# Patient Record
Sex: FEMALE | Race: OTHER | HISPANIC OR LATINO | URBAN - METROPOLITAN AREA
[De-identification: names, ages, dates, MRNs, and addresses within clinical notes are randomized per-mention and may not be internally consistent; named-entity substitution may affect disease eponyms.]

---

## 2017-12-08 ENCOUNTER — EMERGENCY (EMERGENCY)
Facility: HOSPITAL | Age: 82
LOS: 1 days | Discharge: HOME | End: 2017-12-08
Attending: EMERGENCY MEDICINE | Admitting: EMERGENCY MEDICINE
Payer: MEDICARE

## 2017-12-08 VITALS
OXYGEN SATURATION: 99 % | HEART RATE: 66 BPM | RESPIRATION RATE: 18 BRPM | TEMPERATURE: 98 F | DIASTOLIC BLOOD PRESSURE: 79 MMHG | SYSTOLIC BLOOD PRESSURE: 180 MMHG

## 2017-12-08 PROCEDURE — 99284 EMERGENCY DEPT VISIT MOD MDM: CPT

## 2017-12-08 RX ADMIN — Medication 100 MILLIGRAM(S): at 17:45

## 2017-12-08 NOTE — ED PROVIDER NOTE - CARE PLAN
Principal Discharge DX:	Cellulitis Principal Discharge DX:	Cellulitis  Instructions for follow-up, activity and diet:	1) Please finish the course of antibiotics, clindamycin, that you were prescribed. If you start seeing pus from the wounds, increase in redness, swelling, severe pain from the wounds, fevers, please return to the ED immediately.  2) Please follow-up with your primary care doctor within the next 5 days.  Please call today or tomorrow for an appointment.  If you cannot follow-up with your doctor(s), please return to the ED for any urgent issues.  2) If you have any worsening of symptoms or any other concerns please return to the ED immediately.  3) Please continue taking your home medications as directed.  4) You may have been given a copy of your labs and/or imaging.  Please go over these with your primary care doctor.

## 2017-12-08 NOTE — ED PROVIDER NOTE - NS ED ROS FT
Constitutional: no fevers, chills  HEENT: no visual changes, no sore throat, no rhinorrhea  CV: no cp  Resp: no sob  GI: no abd pain, n/v, diarrhea/constipation  : no dysuria, hematuria  MSK: no joint pains  skin: +redness around bx LUE/LLE  neuro: no HA, no confusion  psych: no SI/HI

## 2017-12-08 NOTE — ED PROVIDER NOTE - ATTENDING CONTRIBUTION TO CARE
I performed a face-to-face evaluation of the patient and performed a history and physical examination. I agree with the history and physical examination.    Older woman, skin Bx 1-2 weeks ago (no results yet), p/w areas of redness in the 2 Bx areas not improving w/ 2 days Doxycycline. No F. The 2 Bx sites have erythema, not hot/tender. No pus. Will change Doxy to Clinda. Momo w/ indelible marker. Return if worsening.

## 2017-12-08 NOTE — ED PROVIDER NOTE - OBJECTIVE STATEMENT
83yo F hx of HTN, p/w r/o infection s/p biopsy of LUE, LLE. Pt had shave biopsy done on Nov 30th on two lesions - one on LUE, one on LLE. Reported redness around the biopsies sites started 2d after the biopsy. Was seen at urgent care 2d ago, told she had an infection and rx doxy. Has been on doxy for two days and using neosporin. Reports the redness has continued to spread. + chills. Denies fevers, pain at the biopsy sites. Denies pus drainage from bx sites. No hx of DM.

## 2017-12-08 NOTE — ED PROVIDER NOTE - MEDICAL DECISION MAKING DETAILS
81yo F hx of HTN, p/w r/o infection s/p biopsy of AGUSTO CAMERON. 83yo F hx of HTN, p/w r/o infection s/p biopsy of AGUSTO CAMERON. On PE, biopsy sites look like healing wounds with granulation tissue. No signs of infection at this moment. Anticipate d/c home. 81yo F hx of HTN, p/w r/o infection s/p biopsy of AGUSTO CAMERON. Cellulitis v normal granulation healing process. On PE, biopsy sites look like healing wounds with granulation tissue. No signs of infection at this moment. However, was unable to see status of the wounds prior to abx use - unable to completely r/o infection. Will give a dose of clinda, d/c home with remaining course of clinda.

## 2017-12-08 NOTE — ED ADULT TRIAGE NOTE - CHIEF COMPLAINT QUOTE
s/p skin biopsy to left leg and left arm on 11/30/17.  Site to left arm noted to be red and swollen with yellow granulation.  Pt given PO ABX without change.

## 2017-12-08 NOTE — ED PROVIDER NOTE - PLAN OF CARE
1) Please finish the course of antibiotics, clindamycin, that you were prescribed. If you start seeing pus from the wounds, increase in redness, swelling, severe pain from the wounds, fevers, please return to the ED immediately.  2) Please follow-up with your primary care doctor within the next 5 days.  Please call today or tomorrow for an appointment.  If you cannot follow-up with your doctor(s), please return to the ED for any urgent issues.  2) If you have any worsening of symptoms or any other concerns please return to the ED immediately.  3) Please continue taking your home medications as directed.  4) You may have been given a copy of your labs and/or imaging.  Please go over these with your primary care doctor.

## 2017-12-08 NOTE — ED PROVIDER NOTE - PHYSICAL EXAMINATION
Vitals: WNL  Gen: sitting comfortably in NAD  HEENT: NCAT, sclerae white, anicterus, moist mucous membranes. No exudates  CV: RRR. Audible S1 and S2. No murmurs, rubs, gallops, S3, nor S4  Pulm: Clear to auscultation bilaterally. No wheezes, rales, or rhonchi  Abd: soft, normoactive BS x4, NTND, no rebound, no guarding, no rashes  Musculoskeletal:  No peripheral edema  Skin: 2.5cm round lesion with white granulation tissue on LUE/LLE with ring of erythema around lesions, no tenderness or swelling around lesions, no warmth, no pus, erythema around LUE outlined in distribution of a square in a similar distribution to the bandaid initially on the lesions  Neurologic: responds to questions appropriately

## 2018-11-18 ENCOUNTER — EMERGENCY (EMERGENCY)
Facility: HOSPITAL | Age: 83
LOS: 1 days | Discharge: HOME | End: 2018-11-18
Attending: EMERGENCY MEDICINE
Payer: COMMERCIAL

## 2018-11-18 VITALS
OXYGEN SATURATION: 94 % | TEMPERATURE: 98 F | HEART RATE: 63 BPM | SYSTOLIC BLOOD PRESSURE: 130 MMHG | RESPIRATION RATE: 17 BRPM | DIASTOLIC BLOOD PRESSURE: 70 MMHG

## 2018-11-18 VITALS
RESPIRATION RATE: 20 BRPM | HEART RATE: 65 BPM | OXYGEN SATURATION: 95 % | TEMPERATURE: 98 F | SYSTOLIC BLOOD PRESSURE: 124 MMHG | HEIGHT: 64 IN | DIASTOLIC BLOOD PRESSURE: 63 MMHG | WEIGHT: 128.09 LBS

## 2018-11-18 LAB
ALBUMIN SERPL ELPH-MCNC: 4.3 G/DL — SIGNIFICANT CHANGE UP (ref 3.3–5)
ALP SERPL-CCNC: 76 U/L — SIGNIFICANT CHANGE UP (ref 40–120)
ALT FLD-CCNC: 22 U/L — SIGNIFICANT CHANGE UP (ref 10–45)
ANION GAP SERPL CALC-SCNC: 15 MMOL/L — SIGNIFICANT CHANGE UP (ref 5–17)
AST SERPL-CCNC: 33 U/L — SIGNIFICANT CHANGE UP (ref 10–40)
BASOPHILS # BLD AUTO: 0 K/UL — SIGNIFICANT CHANGE UP (ref 0–0.2)
BASOPHILS NFR BLD AUTO: 0.3 % — SIGNIFICANT CHANGE UP (ref 0–2)
BILIRUB SERPL-MCNC: 0.2 MG/DL — SIGNIFICANT CHANGE UP (ref 0.2–1.2)
BUN SERPL-MCNC: 19 MG/DL — SIGNIFICANT CHANGE UP (ref 7–23)
CALCIUM SERPL-MCNC: 9.1 MG/DL — SIGNIFICANT CHANGE UP (ref 8.4–10.5)
CHLORIDE SERPL-SCNC: 93 MMOL/L — LOW (ref 96–108)
CO2 SERPL-SCNC: 26 MMOL/L — SIGNIFICANT CHANGE UP (ref 22–31)
CREAT SERPL-MCNC: 0.97 MG/DL — SIGNIFICANT CHANGE UP (ref 0.5–1.3)
EOSINOPHIL # BLD AUTO: 0 K/UL — SIGNIFICANT CHANGE UP (ref 0–0.5)
EOSINOPHIL NFR BLD AUTO: 0.6 % — SIGNIFICANT CHANGE UP (ref 0–6)
FLUAV H1 2009 PAND RNA SPEC QL NAA+PROBE: DETECTED
GAS PNL BLDV: SIGNIFICANT CHANGE UP
GLUCOSE SERPL-MCNC: 97 MG/DL — SIGNIFICANT CHANGE UP (ref 70–99)
HCT VFR BLD CALC: 38.9 % — SIGNIFICANT CHANGE UP (ref 34.5–45)
HGB BLD-MCNC: 13.3 G/DL — SIGNIFICANT CHANGE UP (ref 11.5–15.5)
LYMPHOCYTES # BLD AUTO: 1.1 K/UL — SIGNIFICANT CHANGE UP (ref 1–3.3)
LYMPHOCYTES # BLD AUTO: 26.8 % — SIGNIFICANT CHANGE UP (ref 13–44)
MCHC RBC-ENTMCNC: 30 PG — SIGNIFICANT CHANGE UP (ref 27–34)
MCHC RBC-ENTMCNC: 34.3 GM/DL — SIGNIFICANT CHANGE UP (ref 32–36)
MCV RBC AUTO: 87.6 FL — SIGNIFICANT CHANGE UP (ref 80–100)
MONOCYTES # BLD AUTO: 0.6 K/UL — SIGNIFICANT CHANGE UP (ref 0–0.9)
MONOCYTES NFR BLD AUTO: 15.2 % — HIGH (ref 2–14)
NEUTROPHILS # BLD AUTO: 2.2 K/UL — SIGNIFICANT CHANGE UP (ref 1.8–7.4)
NEUTROPHILS NFR BLD AUTO: 57.2 % — SIGNIFICANT CHANGE UP (ref 43–77)
PLATELET # BLD AUTO: 201 K/UL — SIGNIFICANT CHANGE UP (ref 150–400)
POTASSIUM SERPL-MCNC: 3.4 MMOL/L — LOW (ref 3.5–5.3)
POTASSIUM SERPL-SCNC: 3.4 MMOL/L — LOW (ref 3.5–5.3)
PROT SERPL-MCNC: 7.2 G/DL — SIGNIFICANT CHANGE UP (ref 6–8.3)
RAPID RVP RESULT: DETECTED
RBC # BLD: 4.44 M/UL — SIGNIFICANT CHANGE UP (ref 3.8–5.2)
RBC # FLD: 11.8 % — SIGNIFICANT CHANGE UP (ref 10.3–14.5)
SODIUM SERPL-SCNC: 134 MMOL/L — LOW (ref 135–145)
WBC # BLD: 3.9 K/UL — SIGNIFICANT CHANGE UP (ref 3.8–10.5)
WBC # FLD AUTO: 3.9 K/UL — SIGNIFICANT CHANGE UP (ref 3.8–10.5)

## 2018-11-18 PROCEDURE — 87581 M.PNEUMON DNA AMP PROBE: CPT

## 2018-11-18 PROCEDURE — 80053 COMPREHEN METABOLIC PANEL: CPT

## 2018-11-18 PROCEDURE — 87633 RESP VIRUS 12-25 TARGETS: CPT

## 2018-11-18 PROCEDURE — 83605 ASSAY OF LACTIC ACID: CPT

## 2018-11-18 PROCEDURE — 99284 EMERGENCY DEPT VISIT MOD MDM: CPT | Mod: 25

## 2018-11-18 PROCEDURE — 84295 ASSAY OF SERUM SODIUM: CPT

## 2018-11-18 PROCEDURE — 85027 COMPLETE CBC AUTOMATED: CPT

## 2018-11-18 PROCEDURE — 94640 AIRWAY INHALATION TREATMENT: CPT

## 2018-11-18 PROCEDURE — 82947 ASSAY GLUCOSE BLOOD QUANT: CPT

## 2018-11-18 PROCEDURE — 82330 ASSAY OF CALCIUM: CPT

## 2018-11-18 PROCEDURE — 99284 EMERGENCY DEPT VISIT MOD MDM: CPT

## 2018-11-18 PROCEDURE — 84132 ASSAY OF SERUM POTASSIUM: CPT

## 2018-11-18 PROCEDURE — 71250 CT THORAX DX C-: CPT

## 2018-11-18 PROCEDURE — 82803 BLOOD GASES ANY COMBINATION: CPT

## 2018-11-18 PROCEDURE — 71046 X-RAY EXAM CHEST 2 VIEWS: CPT | Mod: 26

## 2018-11-18 PROCEDURE — 85014 HEMATOCRIT: CPT

## 2018-11-18 PROCEDURE — 87798 DETECT AGENT NOS DNA AMP: CPT

## 2018-11-18 PROCEDURE — 82435 ASSAY OF BLOOD CHLORIDE: CPT

## 2018-11-18 PROCEDURE — 71046 X-RAY EXAM CHEST 2 VIEWS: CPT

## 2018-11-18 PROCEDURE — 87486 CHLMYD PNEUM DNA AMP PROBE: CPT

## 2018-11-18 PROCEDURE — 71250 CT THORAX DX C-: CPT | Mod: 26

## 2018-11-18 RX ORDER — IPRATROPIUM/ALBUTEROL SULFATE 18-103MCG
3 AEROSOL WITH ADAPTER (GRAM) INHALATION ONCE
Qty: 0 | Refills: 0 | Status: COMPLETED | OUTPATIENT
Start: 2018-11-18 | End: 2018-11-18

## 2018-11-18 RX ORDER — POTASSIUM CHLORIDE 20 MEQ
40 PACKET (EA) ORAL ONCE
Qty: 0 | Refills: 0 | Status: COMPLETED | OUTPATIENT
Start: 2018-11-18 | End: 2018-11-18

## 2018-11-18 RX ADMIN — Medication 40 MILLIEQUIVALENT(S): at 15:40

## 2018-11-18 RX ADMIN — Medication 3 MILLILITER(S): at 15:40

## 2018-11-18 NOTE — ED ADULT NURSE REASSESSMENT NOTE - NS ED NURSE REASSESS COMMENT FT1
Pt resting comfortably in stretcher breathing unlabored on room air. Assisted pt to bathroom to change into gown. Pt ambulates with no assistive devices, steady gait. Awaiting chest X-ray. Daughter at bedside.

## 2018-11-18 NOTE — ED PROVIDER NOTE - OBJECTIVE STATEMENT
82yo F w/ pmhx of HTN, c/o 1 weeks of productive cough, runny nose, sore throat, associated with SOB, chills, but no fever. Denies any nausea, vomiting, sick contacts, recent travel or any other symptoms.

## 2018-11-18 NOTE — ED ADULT NURSE NOTE - OBJECTIVE STATEMENT
Pt is a 83 year old A&O x3 female with hx of osteoporosis, HTN, and diverticulosis presenting to the ED with a cough for 5 days. Pt states he went to urgent care on Thursday and was given "Allergy nasal spray and medication" with no improvement in symptoms. Pt endorses 5/10 pain to the throat and ribs "when I cough" and has had sputum "white" in color. Pt states she had diarrhea this morning after taking "a stool softener", denies blood in the stool. Pt denies recent fevers at home and states she took Tylenol for the pain at home. Pt denies chest pain, shortness of breath, nausea/vomiting, numbness/tingling to the extremities. Pt is breathing unlabored on room air in no apparent distress. Lung sounds clear. No use of accessory muscles. Abdomen is soft and non-tender. Pt is able to move upper and lower extremities. Ambulatory without any assistive devices. Skin is warm and dry, positive peripheral pulses. Daughter at bedside. Safety and comfort maintained, bed in lowest position.

## 2018-11-18 NOTE — ED PROVIDER NOTE - PROGRESS NOTE DETAILS
Lab result was remarkable for Influenza. Pt was reassessed, feeling better, stable and ready to be discharged.

## 2018-11-18 NOTE — ED ADULT NURSE REASSESSMENT NOTE - NS ED NURSE REASSESS COMMENT FT1
Pt resting comfortably in bed breathing unlabored on room air. MD Saldivar made aware that results are available. Awaiting dispo.

## 2018-11-18 NOTE — ED PROVIDER NOTE - ATTENDING CONTRIBUTION TO CARE
Attending MD Ramirez:  I personally have seen and examined this patient.  Resident note reviewed and agree on plan of care and except where noted.  See MDM for details.

## 2018-11-18 NOTE — ED ADULT NURSE REASSESSMENT NOTE - NS ED NURSE REASSESS COMMENT FT1
Pt resting comfortably in stretcher, breathing unlabored on room air. Educated pt and daughter on plan of care and of importance of proper hand hygiene. Droplet precautions in place. Safety and comfort maintained. Call bell within reach.

## 2018-11-18 NOTE — ED ADULT NURSE NOTE - NSIMPLEMENTINTERV_GEN_ALL_ED
Implemented All Universal Safety Interventions:  [place] to call system. Call bell, personal items and telephone within reach. Instruct patient to call for assistance. Room bathroom lighting operational. Non-slip footwear when patient is off stretcher. Physically safe environment: no spills, clutter or unnecessary equipment. Stretcher in lowest position, wheels locked, appropriate side rails in place.

## 2018-11-18 NOTE — ED ADULT NURSE NOTE - CHPI ED NUR SYMPTOMS NEG
no diaphoresis/no edema/no chills/no body aches/no wheezing/no headache/no hemoptysis/no shortness of breath/no fever/no chest pain

## 2022-01-07 NOTE — ED ADULT TRIAGE NOTE - ACCOMPANIED BY
Refill Request   PATIENT IS REQUESTING A 90 DAY SUPPLY     Methylphenidate 20 mg     Pharmacy: [facility]     LA: 9/17/21
Immediate family member

## 2024-02-02 ENCOUNTER — EMERGENCY (EMERGENCY)
Facility: HOSPITAL | Age: 89
LOS: 1 days | Discharge: HOME | End: 2024-02-02
Attending: EMERGENCY MEDICINE
Payer: COMMERCIAL

## 2024-02-02 VITALS
OXYGEN SATURATION: 99 % | RESPIRATION RATE: 18 BRPM | DIASTOLIC BLOOD PRESSURE: 82 MMHG | WEIGHT: 119.93 LBS | HEART RATE: 83 BPM | SYSTOLIC BLOOD PRESSURE: 145 MMHG | HEIGHT: 64 IN | TEMPERATURE: 99 F

## 2024-02-02 PROBLEM — M81.0 AGE-RELATED OSTEOPOROSIS WITHOUT CURRENT PATHOLOGICAL FRACTURE: Chronic | Status: ACTIVE | Noted: 2018-11-18

## 2024-02-02 PROBLEM — K57.92 DIVERTICULITIS OF INTESTINE, PART UNSPECIFIED, WITHOUT PERFORATION OR ABSCESS WITHOUT BLEEDING: Chronic | Status: ACTIVE | Noted: 2018-11-18

## 2024-02-02 PROBLEM — M19.90 UNSPECIFIED OSTEOARTHRITIS, UNSPECIFIED SITE: Chronic | Status: ACTIVE | Noted: 2018-11-18

## 2024-02-02 PROBLEM — I10 ESSENTIAL (PRIMARY) HYPERTENSION: Chronic | Status: ACTIVE | Noted: 2018-11-18

## 2024-02-02 LAB
ALBUMIN SERPL ELPH-MCNC: 4 G/DL — SIGNIFICANT CHANGE UP (ref 3.3–5)
ALP SERPL-CCNC: 131 U/L — HIGH (ref 40–120)
ALT FLD-CCNC: 16 U/L — SIGNIFICANT CHANGE UP (ref 10–45)
ANION GAP SERPL CALC-SCNC: 12 MMOL/L — SIGNIFICANT CHANGE UP (ref 5–17)
AST SERPL-CCNC: 20 U/L — SIGNIFICANT CHANGE UP (ref 10–40)
BASE EXCESS BLDV CALC-SCNC: 4.6 MMOL/L — HIGH (ref -2–3)
BASOPHILS # BLD AUTO: 0.02 K/UL — SIGNIFICANT CHANGE UP (ref 0–0.2)
BASOPHILS NFR BLD AUTO: 0.2 % — SIGNIFICANT CHANGE UP (ref 0–2)
BILIRUB SERPL-MCNC: 0.4 MG/DL — SIGNIFICANT CHANGE UP (ref 0.2–1.2)
BUN SERPL-MCNC: 13 MG/DL — SIGNIFICANT CHANGE UP (ref 7–23)
CA-I SERPL-SCNC: 1.21 MMOL/L — SIGNIFICANT CHANGE UP (ref 1.15–1.33)
CALCIUM SERPL-MCNC: 9.1 MG/DL — SIGNIFICANT CHANGE UP (ref 8.4–10.5)
CHLORIDE BLDV-SCNC: 98 MMOL/L — SIGNIFICANT CHANGE UP (ref 96–108)
CHLORIDE SERPL-SCNC: 96 MMOL/L — SIGNIFICANT CHANGE UP (ref 96–108)
CO2 BLDV-SCNC: 33 MMOL/L — HIGH (ref 22–26)
CO2 SERPL-SCNC: 25 MMOL/L — SIGNIFICANT CHANGE UP (ref 22–31)
CREAT SERPL-MCNC: 0.77 MG/DL — SIGNIFICANT CHANGE UP (ref 0.5–1.3)
D DIMER BLD IA.RAPID-MCNC: 382 NG/ML DDU — HIGH
EGFR: 75 ML/MIN/1.73M2 — SIGNIFICANT CHANGE UP
EOSINOPHIL # BLD AUTO: 0.03 K/UL — SIGNIFICANT CHANGE UP (ref 0–0.5)
EOSINOPHIL NFR BLD AUTO: 0.4 % — SIGNIFICANT CHANGE UP (ref 0–6)
FLUAV AG NPH QL: SIGNIFICANT CHANGE UP
FLUBV AG NPH QL: SIGNIFICANT CHANGE UP
GAS PNL BLDV: 132 MMOL/L — LOW (ref 136–145)
GAS PNL BLDV: SIGNIFICANT CHANGE UP
GAS PNL BLDV: SIGNIFICANT CHANGE UP
GLUCOSE BLDV-MCNC: 115 MG/DL — HIGH (ref 70–99)
GLUCOSE SERPL-MCNC: 113 MG/DL — HIGH (ref 70–99)
HCO3 BLDV-SCNC: 31 MMOL/L — HIGH (ref 22–29)
HCT VFR BLD CALC: 37.3 % — SIGNIFICANT CHANGE UP (ref 34.5–45)
HCT VFR BLDA CALC: 38 % — SIGNIFICANT CHANGE UP (ref 34.5–46.5)
HGB BLD CALC-MCNC: 12.5 G/DL — SIGNIFICANT CHANGE UP (ref 11.7–16.1)
HGB BLD-MCNC: 12 G/DL — SIGNIFICANT CHANGE UP (ref 11.5–15.5)
IMM GRANULOCYTES NFR BLD AUTO: 0.5 % — SIGNIFICANT CHANGE UP (ref 0–0.9)
LACTATE BLDV-MCNC: 1.2 MMOL/L — SIGNIFICANT CHANGE UP (ref 0.5–2)
LYMPHOCYTES # BLD AUTO: 1 K/UL — SIGNIFICANT CHANGE UP (ref 1–3.3)
LYMPHOCYTES # BLD AUTO: 12.1 % — LOW (ref 13–44)
MCHC RBC-ENTMCNC: 28.2 PG — SIGNIFICANT CHANGE UP (ref 27–34)
MCHC RBC-ENTMCNC: 32.2 GM/DL — SIGNIFICANT CHANGE UP (ref 32–36)
MCV RBC AUTO: 87.8 FL — SIGNIFICANT CHANGE UP (ref 80–100)
MONOCYTES # BLD AUTO: 0.71 K/UL — SIGNIFICANT CHANGE UP (ref 0–0.9)
MONOCYTES NFR BLD AUTO: 8.6 % — SIGNIFICANT CHANGE UP (ref 2–14)
NEUTROPHILS # BLD AUTO: 6.47 K/UL — SIGNIFICANT CHANGE UP (ref 1.8–7.4)
NEUTROPHILS NFR BLD AUTO: 78.2 % — HIGH (ref 43–77)
NRBC # BLD: 0 /100 WBCS — SIGNIFICANT CHANGE UP (ref 0–0)
PCO2 BLDV: 54 MMHG — HIGH (ref 39–42)
PH BLDV: 7.37 — SIGNIFICANT CHANGE UP (ref 7.32–7.43)
PLATELET # BLD AUTO: 332 K/UL — SIGNIFICANT CHANGE UP (ref 150–400)
PO2 BLDV: 17 MMHG — LOW (ref 25–45)
POTASSIUM BLDV-SCNC: 4 MMOL/L — SIGNIFICANT CHANGE UP (ref 3.5–5.1)
POTASSIUM SERPL-MCNC: 4 MMOL/L — SIGNIFICANT CHANGE UP (ref 3.5–5.3)
POTASSIUM SERPL-SCNC: 4 MMOL/L — SIGNIFICANT CHANGE UP (ref 3.5–5.3)
PROT SERPL-MCNC: 7.8 G/DL — SIGNIFICANT CHANGE UP (ref 6–8.3)
RBC # BLD: 4.25 M/UL — SIGNIFICANT CHANGE UP (ref 3.8–5.2)
RBC # FLD: 11.7 % — SIGNIFICANT CHANGE UP (ref 10.3–14.5)
RSV RNA NPH QL NAA+NON-PROBE: SIGNIFICANT CHANGE UP
SAO2 % BLDV: 16.2 % — LOW (ref 67–88)
SARS-COV-2 RNA SPEC QL NAA+PROBE: SIGNIFICANT CHANGE UP
SODIUM SERPL-SCNC: 133 MMOL/L — LOW (ref 135–145)
TROPONIN T, HIGH SENSITIVITY RESULT: 8 NG/L — SIGNIFICANT CHANGE UP (ref 0–51)
TROPONIN T, HIGH SENSITIVITY RESULT: 9 NG/L — SIGNIFICANT CHANGE UP (ref 0–51)
WBC # BLD: 8.27 K/UL — SIGNIFICANT CHANGE UP (ref 3.8–10.5)
WBC # FLD AUTO: 8.27 K/UL — SIGNIFICANT CHANGE UP (ref 3.8–10.5)

## 2024-02-02 PROCEDURE — 71046 X-RAY EXAM CHEST 2 VIEWS: CPT | Mod: 26

## 2024-02-02 PROCEDURE — 71275 CT ANGIOGRAPHY CHEST: CPT | Mod: 26,MA

## 2024-02-02 PROCEDURE — 99223 1ST HOSP IP/OBS HIGH 75: CPT

## 2024-02-02 RX ORDER — ALBUTEROL 90 UG/1
2 AEROSOL, METERED ORAL EVERY 6 HOURS
Refills: 0 | Status: DISCONTINUED | OUTPATIENT
Start: 2024-02-02 | End: 2024-02-06

## 2024-02-02 RX ORDER — IPRATROPIUM/ALBUTEROL SULFATE 18-103MCG
3 AEROSOL WITH ADAPTER (GRAM) INHALATION ONCE
Refills: 0 | Status: COMPLETED | OUTPATIENT
Start: 2024-02-02 | End: 2024-02-02

## 2024-02-02 RX ADMIN — Medication 3 MILLILITER(S): at 20:32

## 2024-02-02 RX ADMIN — Medication 200 MILLIGRAM(S): at 20:31

## 2024-02-02 NOTE — ED PROVIDER NOTE - CLINICAL SUMMARY MEDICAL DECISION MAKING FREE TEXT BOX
88-year-old female PMH HTN presenting with 4 weeks of chest discomfort and productive cough.  Productive cough noted on evaluation.  Normal work of breathing on room air, satting 94% on room air.  Patient notes that she does have a cardiologist she follows with, gets annual echoes, is due for echo next month.  Chest x-ray from [facility] shows unilateral effusion, possibly infectious in etiology given her reported cough but may be cardiac in nature given her associated dyspnea on exertion and prolonged symptoms.  will add on CTA to rule out PE and further eval effusion.

## 2024-02-02 NOTE — ED PROVIDER NOTE - PROGRESS NOTE DETAILS
Jaimie Potter MD PGY-2: CT with evidence of pneumonia, will order ceftriaxone, azithromycin for CAP coverage. no evidence of PE. amb sat 91%. patient to CDU for IV antibiotics and ongoing monitoring.

## 2024-02-02 NOTE — ED PROVIDER NOTE - ATTENDING CONTRIBUTION TO CARE
I, Michael Montalvo MD, Emergency Medicine Attending Physician, personally saw and examined the patient and I personally made/approve the management plan and take responsibility for the patient management.    MDM: 88-year-old female with history of osteoporosis, hypertension, diverticulitis, who presents with 3 to 4 weeks of cough with green sputum, associated with fatigue, chest pressure and burning sensation and shortness of breath.  Patient with recent travel at the end of December.  Denies exertional or pleuritic or positional symptoms.  Denies hemoptysis.    ROS: denies trauma, fevers, chills, abdominal pain, nausea, vomiting, diaphoresis, dizziness, syncope, leg pain/swelling, paresthesia, numbness, or weakness.    On examination, patient with stable vitals, nontoxic, in no acute distress. Cardiac examination RRR, lungs (+) right base of the lung with rhonchi, but no wheezing and normal air entry, abdomen soft and nontender, neurovascularly intact in all 4 extremities.  There is no calf tenderness or leg swelling.  Negative Sophia's sign.    Will keep patient on cardiac monitor, obtain EKG and troponin to evaluate for possible cardiac abnormality including ACS and arrhythmia.  Will obtain labs to evaluate for hematologic disorder, metabolic derangements, hepatic and renal function, and screen for infection.  Will obtain chest x-ray to evaluate for acute cardiopulmonary pathology.  Will obtain D-dimer given patient with recent travel to evaluate for PE.  Will give patient bronchodilator and Tessalon Perle and reassess.    My independent interpretation of the EKG shows:  Normal sinus rhythm with rate of 80 bpm, incomplete RBBB, LAFB, leftward axis deviation, no ST elevations or depressions.    My independent interpretation of the chest x-ray images shows right base of lung with pleural effusion.   More details to be seen in the official radiologist read.    Labs showed no leukocytosis, procalcitonin 0.07.  Holding off on empiric antibiotics.  Mild hyponatremia of 133, troponin 8, proBNP 170.  D-dimer elevated to 382, will obtain CTA to rule out PE.    The patient would benefit from observation in the CDU for further monitoring with frequent reassessments, Q4 hour vital signs, telemetry, and further diagnostic studies including CTA chest and echo. I, Michael Montalvo MD, Emergency Medicine Attending Physician, personally saw and examined the patient and I personally made/approve the management plan and take responsibility for the patient management.    MDM: 88-year-old female with history of osteoporosis, hypertension, diverticulitis, who presents with 3 to 4 weeks of cough with green sputum, associated with fatigue, chest pressure and burning sensation and shortness of breath.  Patient with recent travel at the end of December.  Denies exertional or pleuritic or positional symptoms.  Denies hemoptysis.    ROS: denies trauma, fevers, chills, abdominal pain, nausea, vomiting, diaphoresis, dizziness, syncope, leg pain/swelling, paresthesia, numbness, or weakness.    On examination, patient with stable vitals, nontoxic, in no acute distress. Cardiac examination RRR, lungs (+) right base of the lung with rhonchi, but no wheezing and normal air entry, abdomen soft and nontender, neurovascularly intact in all 4 extremities.  There is no calf tenderness or leg swelling.  Negative Sophia's sign.    Will keep patient on cardiac monitor, obtain EKG and troponin to evaluate for possible cardiac abnormality including ACS and arrhythmia.  Will obtain labs to evaluate for hematologic disorder, metabolic derangements, hepatic and renal function, and screen for infection.  Will obtain chest x-ray to evaluate for acute cardiopulmonary pathology.  Will obtain D-dimer given patient with recent travel to evaluate for PE.  Will give patient bronchodilator and Tessalon Perle and reassess.    My independent interpretation of the EKG shows:  Normal sinus rhythm with rate of 80 bpm, incomplete RBBB, LAFB, leftward axis deviation, no ST elevations or depressions.    My independent interpretation of the chest x-ray images shows right base of lung with pleural effusion.   More details to be seen in the official radiologist read.    Labs showed no leukocytosis, procalcitonin 0.07.  Holding off on empiric antibiotics.  Mild hyponatremia of 133, troponin 8, proBNP 170.  D-dimer elevated to 382, will obtain CTA to rule out PE.    Repeat troponin within normal range.  Signed out at 2300 pending CTA    The patient would benefit from observation in the CDU for further monitoring with frequent reassessments, Q4 hour vital signs, telemetry, and further diagnostic studies including CTA chest and echo.

## 2024-02-02 NOTE — ED PROVIDER NOTE - OBJECTIVE STATEMENT
88Y F PMH HTN, osteoporosis, diverticulitis presenting with cough x1 month. 88Y F PMH HTN, osteoporosis, diverticulitis presenting with cough x1 month. Patient reports that she has had a cough productive of green sputum over the last 1 month, associated with intermittent chest discomfort and shortness of breath.  Denies recent fevers.  Tested negative for flu/COVID.  No recent antibiotic use.  Has been taking OTC cough medication with minimal relief.  Reports that she traveled to [place] at the end of December, no other recent travel.  Non-smoker.  Denies leg pain or swelling.

## 2024-02-02 NOTE — ED PROVIDER NOTE - CARE PLAN
1 Principal Discharge DX:	Pleural effusion  Secondary Diagnosis:	Chest pain  Secondary Diagnosis:	SOB (shortness of breath)

## 2024-02-02 NOTE — ED PROVIDER NOTE - RAPID ASSESSMENT
89yo F with PMH HTN, presenting with chest congestion and cough for 4 weeks. + green sputum, fatigued, SOB, chest pressure. Denies fever/chills. F/u with UC during this time, no CXR, COVID negative. No asthma, no smoking history.     [place]: Patient seen by myself in triage area for rapid assessment only. Full H&P and complete work-up to be performed in main emergency department by ED providers.

## 2024-02-02 NOTE — ED ADULT NURSE NOTE - OBJECTIVE STATEMENT
89 y/o Female present to the ED c/o a persistent cough for 4 weeks. Pt states feeling chest pain when coughing. PMHx HTN, Osteopetrosis. Patient denies recent travel. Pt denies fever, chills, headache, n/v/d, abdominal pain. Pt denies abnormal stool or urine. Pt denies radiating pain. Pt airway is patent. Pt breathing shallow and unlabored. Pt skin is warm and dry, appropriate color for ethnicity. Stretcher locked and in lowest position, appropriate side rails up. Pt instructed to notify RN if assistance is needed.

## 2024-02-02 NOTE — ED PROVIDER NOTE - PHYSICAL EXAMINATION
GENERAL: Awake, alert, NAD  HEAD: NC/AT, neck supple, moist mucous membranes  EYES: PERRL, EOM grossly intact, sclera anicteric  LUNGS: normal WOB on RA, rhonchorous breath sounds bilaterally  CARDIAC: RRR, no m/r/g  ABDOMEN: Soft, non tender, non distended, no rebound, no guarding  BACK: No midline spinal tenderness, no CVA tenderness  EXT: No edema, no calf tenderness, no deformities.  NEURO: A&Ox3. Moving all extremities.  SKIN: Warm and dry. No rash.  PSYCH: Normal affect.

## 2024-02-03 LAB
ALBUMIN SERPL ELPH-MCNC: 3.7 G/DL — SIGNIFICANT CHANGE UP (ref 3.3–5)
ALP SERPL-CCNC: 122 U/L — HIGH (ref 40–120)
ALT FLD-CCNC: 15 U/L — SIGNIFICANT CHANGE UP (ref 10–45)
ANION GAP SERPL CALC-SCNC: 10 MMOL/L — SIGNIFICANT CHANGE UP (ref 5–17)
AST SERPL-CCNC: 17 U/L — SIGNIFICANT CHANGE UP (ref 10–40)
BASE EXCESS BLDV CALC-SCNC: 2.6 MMOL/L — SIGNIFICANT CHANGE UP (ref -2–3)
BASOPHILS # BLD AUTO: 0.02 K/UL — SIGNIFICANT CHANGE UP (ref 0–0.2)
BASOPHILS NFR BLD AUTO: 0.2 % — SIGNIFICANT CHANGE UP (ref 0–2)
BILIRUB SERPL-MCNC: 0.2 MG/DL — SIGNIFICANT CHANGE UP (ref 0.2–1.2)
BUN SERPL-MCNC: 10 MG/DL — SIGNIFICANT CHANGE UP (ref 7–23)
CA-I SERPL-SCNC: 1.21 MMOL/L — SIGNIFICANT CHANGE UP (ref 1.15–1.33)
CALCIUM SERPL-MCNC: 8.9 MG/DL — SIGNIFICANT CHANGE UP (ref 8.4–10.5)
CHLORIDE BLDV-SCNC: 102 MMOL/L — SIGNIFICANT CHANGE UP (ref 96–108)
CHLORIDE SERPL-SCNC: 100 MMOL/L — SIGNIFICANT CHANGE UP (ref 96–108)
CO2 BLDV-SCNC: 28 MMOL/L — HIGH (ref 22–26)
CO2 SERPL-SCNC: 26 MMOL/L — SIGNIFICANT CHANGE UP (ref 22–31)
CREAT SERPL-MCNC: 0.81 MG/DL — SIGNIFICANT CHANGE UP (ref 0.5–1.3)
EGFR: 71 ML/MIN/1.73M2 — SIGNIFICANT CHANGE UP
EOSINOPHIL # BLD AUTO: 0.02 K/UL — SIGNIFICANT CHANGE UP (ref 0–0.5)
EOSINOPHIL NFR BLD AUTO: 0.2 % — SIGNIFICANT CHANGE UP (ref 0–6)
GAS PNL BLDV: 135 MMOL/L — LOW (ref 136–145)
GAS PNL BLDV: SIGNIFICANT CHANGE UP
GAS PNL BLDV: SIGNIFICANT CHANGE UP
GLUCOSE BLDV-MCNC: 131 MG/DL — HIGH (ref 70–99)
GLUCOSE SERPL-MCNC: 132 MG/DL — HIGH (ref 70–99)
HCO3 BLDV-SCNC: 27 MMOL/L — SIGNIFICANT CHANGE UP (ref 22–29)
HCT VFR BLD CALC: 34.2 % — LOW (ref 34.5–45)
HCT VFR BLDA CALC: 19 % — CRITICAL LOW (ref 34.5–46.5)
HGB BLD CALC-MCNC: 6.4 G/DL — CRITICAL LOW (ref 11.7–16.1)
HGB BLD-MCNC: 11.3 G/DL — LOW (ref 11.5–15.5)
IMM GRANULOCYTES NFR BLD AUTO: 0.5 % — SIGNIFICANT CHANGE UP (ref 0–0.9)
LACTATE BLDV-MCNC: 0.8 MMOL/L — SIGNIFICANT CHANGE UP (ref 0.5–2)
LYMPHOCYTES # BLD AUTO: 1.04 K/UL — SIGNIFICANT CHANGE UP (ref 1–3.3)
LYMPHOCYTES # BLD AUTO: 12.1 % — LOW (ref 13–44)
MCHC RBC-ENTMCNC: 28.9 PG — SIGNIFICANT CHANGE UP (ref 27–34)
MCHC RBC-ENTMCNC: 33 GM/DL — SIGNIFICANT CHANGE UP (ref 32–36)
MCV RBC AUTO: 87.5 FL — SIGNIFICANT CHANGE UP (ref 80–100)
MONOCYTES # BLD AUTO: 0.71 K/UL — SIGNIFICANT CHANGE UP (ref 0–0.9)
MONOCYTES NFR BLD AUTO: 8.3 % — SIGNIFICANT CHANGE UP (ref 2–14)
NEUTROPHILS # BLD AUTO: 6.75 K/UL — SIGNIFICANT CHANGE UP (ref 1.8–7.4)
NEUTROPHILS NFR BLD AUTO: 78.7 % — HIGH (ref 43–77)
NRBC # BLD: 0 /100 WBCS — SIGNIFICANT CHANGE UP (ref 0–0)
PCO2 BLDV: 41 MMHG — SIGNIFICANT CHANGE UP (ref 39–42)
PH BLDV: 7.43 — SIGNIFICANT CHANGE UP (ref 7.32–7.43)
PLATELET # BLD AUTO: 320 K/UL — SIGNIFICANT CHANGE UP (ref 150–400)
PO2 BLDV: 43 MMHG — SIGNIFICANT CHANGE UP (ref 25–45)
POTASSIUM BLDV-SCNC: 4.4 MMOL/L — SIGNIFICANT CHANGE UP (ref 3.5–5.1)
POTASSIUM SERPL-MCNC: 4.5 MMOL/L — SIGNIFICANT CHANGE UP (ref 3.5–5.3)
POTASSIUM SERPL-SCNC: 4.5 MMOL/L — SIGNIFICANT CHANGE UP (ref 3.5–5.3)
PROT SERPL-MCNC: 6.9 G/DL — SIGNIFICANT CHANGE UP (ref 6–8.3)
RBC # BLD: 3.91 M/UL — SIGNIFICANT CHANGE UP (ref 3.8–5.2)
RBC # FLD: 11.5 % — SIGNIFICANT CHANGE UP (ref 10.3–14.5)
SAO2 % BLDV: 72.1 % — SIGNIFICANT CHANGE UP (ref 67–88)
SODIUM SERPL-SCNC: 136 MMOL/L — SIGNIFICANT CHANGE UP (ref 135–145)
WBC # BLD: 8.58 K/UL — SIGNIFICANT CHANGE UP (ref 3.8–10.5)
WBC # FLD AUTO: 8.58 K/UL — SIGNIFICANT CHANGE UP (ref 3.8–10.5)

## 2024-02-03 PROCEDURE — 99232 SBSQ HOSP IP/OBS MODERATE 35: CPT

## 2024-02-03 RX ORDER — AZITHROMYCIN 500 MG/1
500 TABLET, FILM COATED ORAL EVERY 24 HOURS
Refills: 0 | Status: DISCONTINUED | OUTPATIENT
Start: 2024-02-04 | End: 2024-02-06

## 2024-02-03 RX ORDER — AZITHROMYCIN 500 MG/1
TABLET, FILM COATED ORAL
Refills: 0 | Status: DISCONTINUED | OUTPATIENT
Start: 2024-02-03 | End: 2024-02-06

## 2024-02-03 RX ORDER — PROPRANOLOL HCL 160 MG
60 CAPSULE, EXTENDED RELEASE 24HR ORAL DAILY
Refills: 0 | Status: DISCONTINUED | OUTPATIENT
Start: 2024-02-03 | End: 2024-02-06

## 2024-02-03 RX ORDER — CEFTRIAXONE 500 MG/1
1000 INJECTION, POWDER, FOR SOLUTION INTRAMUSCULAR; INTRAVENOUS EVERY 24 HOURS
Refills: 0 | Status: DISCONTINUED | OUTPATIENT
Start: 2024-02-04 | End: 2024-02-06

## 2024-02-03 RX ORDER — ACETAMINOPHEN 500 MG
975 TABLET ORAL EVERY 6 HOURS
Refills: 0 | Status: DISCONTINUED | OUTPATIENT
Start: 2024-02-03 | End: 2024-02-06

## 2024-02-03 RX ORDER — HYDROCHLOROTHIAZIDE 25 MG
1 TABLET ORAL
Refills: 0 | DISCHARGE

## 2024-02-03 RX ORDER — CEFTRIAXONE 500 MG/1
1000 INJECTION, POWDER, FOR SOLUTION INTRAMUSCULAR; INTRAVENOUS ONCE
Refills: 0 | Status: COMPLETED | OUTPATIENT
Start: 2024-02-03 | End: 2024-02-03

## 2024-02-03 RX ORDER — AZITHROMYCIN 500 MG/1
500 TABLET, FILM COATED ORAL ONCE
Refills: 0 | Status: COMPLETED | OUTPATIENT
Start: 2024-02-03 | End: 2024-02-03

## 2024-02-03 RX ORDER — PROPRANOLOL HCL 160 MG
0 CAPSULE, EXTENDED RELEASE 24HR ORAL
Refills: 0 | DISCHARGE

## 2024-02-03 RX ORDER — AMLODIPINE BESYLATE 2.5 MG/1
1 TABLET ORAL
Qty: 0 | Refills: 0 | DISCHARGE

## 2024-02-03 RX ORDER — LOSARTAN POTASSIUM 100 MG/1
100 TABLET, FILM COATED ORAL DAILY
Refills: 0 | Status: DISCONTINUED | OUTPATIENT
Start: 2024-02-03 | End: 2024-02-06

## 2024-02-03 RX ORDER — LOSARTAN POTASSIUM 100 MG/1
1 TABLET, FILM COATED ORAL
Refills: 0 | DISCHARGE

## 2024-02-03 RX ORDER — LOSARTAN/HYDROCHLOROTHIAZIDE 100MG-25MG
1 TABLET ORAL
Qty: 0 | Refills: 0 | DISCHARGE

## 2024-02-03 RX ORDER — AZITHROMYCIN 500 MG/1
500 TABLET, FILM COATED ORAL ONCE
Refills: 0 | Status: DISCONTINUED | OUTPATIENT
Start: 2024-02-03 | End: 2024-02-03

## 2024-02-03 RX ORDER — PROPRANOLOL HCL 160 MG
1 CAPSULE, EXTENDED RELEASE 24HR ORAL
Qty: 0 | Refills: 0 | DISCHARGE

## 2024-02-03 RX ADMIN — Medication 100 MILLIGRAM(S): at 20:43

## 2024-02-03 RX ADMIN — Medication 975 MILLIGRAM(S): at 21:18

## 2024-02-03 RX ADMIN — Medication 975 MILLIGRAM(S): at 05:26

## 2024-02-03 RX ADMIN — CEFTRIAXONE 100 MILLIGRAM(S): 500 INJECTION, POWDER, FOR SOLUTION INTRAMUSCULAR; INTRAVENOUS at 02:37

## 2024-02-03 RX ADMIN — Medication 975 MILLIGRAM(S): at 03:39

## 2024-02-03 RX ADMIN — Medication 60 MILLIGRAM(S): at 06:12

## 2024-02-03 RX ADMIN — Medication 100 MILLIGRAM(S): at 06:12

## 2024-02-03 RX ADMIN — AZITHROMYCIN 255 MILLIGRAM(S): 500 TABLET, FILM COATED ORAL at 03:39

## 2024-02-03 RX ADMIN — Medication 975 MILLIGRAM(S): at 20:48

## 2024-02-03 NOTE — ED CDU PROVIDER INITIAL DAY NOTE - OBJECTIVE STATEMENT
88Y F PMH HTN, osteoporosis, diverticulitis presenting with cough x1 month. Patient reports that she has had a cough productive of green sputum over the last 1 month, associated with intermittent chest discomfort and shortness of breath.  Denies recent fevers.  Tested negative for flu/COVID.  No recent antibiotic use.  Has been taking OTC cough medication with minimal relief.  Reports that she traveled to [place] at the end of December, no other recent travel.  Non-smoker.  Denies leg pain or swelling.   ED course: Afebrile, WBC 8.27, Troponin 8-9, pro-. D-dimer 382. CXR with concern for small pleural effusion, with CTA "No evidence of pulmonary embolus. Right lower lobe consolidation most compatible with pneumonia in the appropriate clinical setting. No pleural effusion." Plan for antibiotics and symptomatic management in CDU.
normal (ped)...

## 2024-02-03 NOTE — ED CDU PROVIDER INITIAL DAY NOTE - RESPIRATORY, MLM
No increased work of breathing.  Bilateral rhonchi, no wheezing Electrodesiccation Text: The wound bed was treated with electrodesiccation after the biopsy was performed.

## 2024-02-03 NOTE — ED CDU PROVIDER INITIAL DAY NOTE - ATTENDING APP SHARED VISIT CONTRIBUTION OF CARE
[place] ([facility]): 1 I, Michael Montalvo MD, Emergency Medicine Attending Physician, personally saw and examined the patient and I personally made/approve the management plan and take responsibility for the patient management.    MDM: 88-year-old female with history of osteoporosis, hypertension, diverticulitis, who presents with 3 to 4 weeks of cough with green sputum, associated with fatigue, chest pressure and burning sensation and shortness of breath.  Patient with recent travel at the end of December.  Denies exertional or pleuritic or positional symptoms.  Denies hemoptysis.    ROS: denies trauma, fevers, chills, abdominal pain, nausea, vomiting, diaphoresis, dizziness, syncope, leg pain/swelling, paresthesia, numbness, or weakness.    On examination, patient with stable vitals, nontoxic, in no acute distress. Cardiac examination RRR, lungs (+) right base of the lung with rhonchi, but no wheezing and normal air entry, abdomen soft and nontender, neurovascularly intact in all 4 extremities.  There is no calf tenderness or leg swelling.  Negative Sophia's sign.    Will keep patient on cardiac monitor, obtain EKG and troponin to evaluate for possible cardiac abnormality including ACS and arrhythmia.  Will obtain labs to evaluate for hematologic disorder, metabolic derangements, hepatic and renal function, and screen for infection.  Will obtain chest x-ray to evaluate for acute cardiopulmonary pathology.  Will obtain D-dimer given patient with recent travel to evaluate for PE.  Will give patient bronchodilator and Tessalon Perle and reassess.    My independent interpretation of the EKG shows:  Normal sinus rhythm with rate of 80 bpm, incomplete RBBB, LAFB, leftward axis deviation, no ST elevations or depressions.    My independent interpretation of the chest x-ray images shows right base of lung with pleural effusion.   More details to be seen in the official radiologist read.    Labs showed no leukocytosis, procalcitonin 0.07.  Holding off on empiric antibiotics.  Mild hyponatremia of 133, troponin 8, proBNP 170.  D-dimer elevated to 382, will obtain CTA to rule out PE.    Repeat troponin within normal range.  Signed out at 2300 pending CTA    The patient would benefit from observation in the CDU for further monitoring with frequent reassessments, Q4 hour vital signs, telemetry, and further diagnostic studies including CTA chest and echo.

## 2024-02-03 NOTE — ED CDU PROVIDER DISPOSITION NOTE - PATIENT PORTAL LINK FT
You can access the FollowMyHealth Patient Portal offered by [facility] by registering at the following website: [email or web address]. By joining [employer]’s FollowMyHealth portal, you will also be able to view your health information using other applications (apps) compatible with our system.

## 2024-02-03 NOTE — ED CDU PROVIDER DISPOSITION NOTE - NSFOLLOWUPINSTRUCTIONS_ED_ALL_ED_FT
Hydrate.     Please take Tylenol 650-1000mg every 6 hours as needed for pain/fever.     We recommend you follow up with your primary care provider within the next 2-3 days, please bring all of your results with you.     Please return to the Emergency Department with new, worsening, or concerning symptoms, such as:  -Shortness of breath or trouble breathing  -Pressure, pain, tightness in chest  -Facial drooping, arm weakness, or speech difficulty   -Head injury or loss of consciousness   -Nonstop bleeding or an open wound     *More detailed information regarding your visit and discharge can be found by reviewing this packet See your Primary Doctor this week for follow up -- call to discuss.    Stay well hydrated, get plenty of rest, continue current medications.    Use CEFDINIR and AZITHROMYCIN as directed for pneumonia -- see medication warnings.    See PNEUMONIA information and return instructions given to you.    Seek immediate medical care for new/worsening symptoms/concerns.

## 2024-02-03 NOTE — ED CDU PROVIDER INITIAL DAY NOTE - NSICDXNOPASTSURGICALHX_GEN_ALL_ED
<-- Click to add NO significant Past Surgical History Attending Attestation (For Attendings USE Only)...

## 2024-02-03 NOTE — ED CDU PROVIDER DISPOSITION NOTE - CLINICAL COURSE
88Y F PMH HTN, osteoporosis, diverticulitis presenting with cough x1 month. Patient reports that she has had a cough productive of green sputum over the last 1 month, associated with intermittent chest discomfort and shortness of breath.  Denies recent fevers.  Tested negative for flu/COVID.  No recent antibiotic use.  Has been taking OTC cough medication with minimal relief.  Reports that she traveled to [place] at the end of December, no other recent travel.  Non-smoker.  Denies leg pain or swelling.   ED course: Afebrile, WBC 8.27, Troponin 8-9, pro-. D-dimer 382. CXR with concern for small pleural effusion, with CTA "No evidence of pulmonary embolus. Right lower lobe consolidation most compatible with pneumonia in the appropriate clinical setting. No pleural effusion." Plan for antibiotics and symptomatic management in CDU. 88Y F PMH HTN, osteoporosis, diverticulitis presenting with cough x1 month. Patient reports that she has had a cough productive of green sputum over the last 1 month, associated with intermittent chest discomfort and shortness of breath.  Denies recent fevers.  Tested negative for flu/COVID.  No recent antibiotic use.  Has been taking OTC cough medication with minimal relief.  Reports that she traveled to [place] at the end of December, no other recent travel.  Non-smoker.  Denies leg pain or swelling.   ED course: Afebrile, WBC 8.27, Troponin 8-9, pro-. D-dimer 382. CXR with concern for small pleural effusion, with CTA "No evidence of pulmonary embolus. Right lower lobe consolidation most compatible with pneumonia in the appropriate clinical setting. No pleural effusion." Plan for antibiotics and symptomatic management in CDU.    symptoms improved since ED arrival feels comfortable with discharge home. tolerated PO. cleared for discharge home per ED attending Dr. Allen.

## 2024-02-03 NOTE — ED CDU PROVIDER INITIAL DAY NOTE - DETAILS
Concern for neurologic deterioration
antibiotics, symptom management, DW Eva  monitor O2 sat  vitals every 4 hours, frequent reevaluations

## 2024-02-03 NOTE — ED CDU PROVIDER SUBSEQUENT DAY NOTE - PROGRESS NOTE DETAILS
[facility] Attending  well appearing, +dry cough. no sob, cp. tolerating PO. feels better since yesterday. Pt resting comfortably. NAD. No complaints. VSS. reports mild sob and plata while ambulatory. but overall feeling better. pt lives at home alone, daughter at bedside, will continue to monitor and IV abx per plan. -MONTY Solis CDU PROGRESS NOTE MONTY PASCUAL: received signout from prior team - pt is clinically improving, receiving ceftriaxone and azithromycin for presumed CAP with RLL infiltrate on CT. pt reassessed no respiratory distress but occasional deep cough, spo2 94-95% on RA. well appearing. VSS. pt lives in the [place] and does not have transportation until am. will continue observation with spo2, abx and supportive care for cough. anticipate am discharge.

## 2024-02-03 NOTE — ED ADULT NURSE REASSESSMENT NOTE - NSFALLHARMRISKINTERV_ED_ALL_ED

## 2024-02-03 NOTE — ED CDU PROVIDER DISPOSITION NOTE - ATTENDING APP SHARED VISIT CONTRIBUTION OF CARE
------------ATTENDING NOTE------------  remained stable in CDU, tolerating PO, no fevers, ambulating well w/o distress, nml VS at dc, cleared by labs/imaging c/w CAP, in depth dw all about ddx, tx, pantoja, continued close outpt fu.  - Jonathon Allen MD   -----------------------------------------------

## 2024-02-04 VITALS
DIASTOLIC BLOOD PRESSURE: 69 MMHG | OXYGEN SATURATION: 96 % | HEART RATE: 69 BPM | SYSTOLIC BLOOD PRESSURE: 120 MMHG | RESPIRATION RATE: 16 BRPM | TEMPERATURE: 98 F

## 2024-02-04 PROCEDURE — 96375 TX/PRO/DX INJ NEW DRUG ADDON: CPT

## 2024-02-04 PROCEDURE — 85018 HEMOGLOBIN: CPT

## 2024-02-04 PROCEDURE — 80053 COMPREHEN METABOLIC PANEL: CPT

## 2024-02-04 PROCEDURE — 85379 FIBRIN DEGRADATION QUANT: CPT

## 2024-02-04 PROCEDURE — 96374 THER/PROPH/DIAG INJ IV PUSH: CPT

## 2024-02-04 PROCEDURE — G0378: CPT

## 2024-02-04 PROCEDURE — 71046 X-RAY EXAM CHEST 2 VIEWS: CPT

## 2024-02-04 PROCEDURE — 83605 ASSAY OF LACTIC ACID: CPT

## 2024-02-04 PROCEDURE — 94640 AIRWAY INHALATION TREATMENT: CPT

## 2024-02-04 PROCEDURE — 82947 ASSAY GLUCOSE BLOOD QUANT: CPT

## 2024-02-04 PROCEDURE — 99285 EMERGENCY DEPT VISIT HI MDM: CPT | Mod: 25

## 2024-02-04 PROCEDURE — 96376 TX/PRO/DX INJ SAME DRUG ADON: CPT

## 2024-02-04 PROCEDURE — 84295 ASSAY OF SERUM SODIUM: CPT

## 2024-02-04 PROCEDURE — 85014 HEMATOCRIT: CPT

## 2024-02-04 PROCEDURE — 85025 COMPLETE CBC W/AUTO DIFF WBC: CPT

## 2024-02-04 PROCEDURE — 71275 CT ANGIOGRAPHY CHEST: CPT | Mod: MA

## 2024-02-04 PROCEDURE — 82803 BLOOD GASES ANY COMBINATION: CPT

## 2024-02-04 PROCEDURE — 82330 ASSAY OF CALCIUM: CPT

## 2024-02-04 PROCEDURE — 84145 PROCALCITONIN (PCT): CPT

## 2024-02-04 PROCEDURE — 93005 ELECTROCARDIOGRAM TRACING: CPT

## 2024-02-04 PROCEDURE — 87637 SARSCOV2&INF A&B&RSV AMP PRB: CPT

## 2024-02-04 PROCEDURE — 82435 ASSAY OF BLOOD CHLORIDE: CPT

## 2024-02-04 PROCEDURE — 36415 COLL VENOUS BLD VENIPUNCTURE: CPT

## 2024-02-04 PROCEDURE — 83880 ASSAY OF NATRIURETIC PEPTIDE: CPT

## 2024-02-04 PROCEDURE — 99239 HOSP IP/OBS DSCHRG MGMT >30: CPT

## 2024-02-04 PROCEDURE — 84484 ASSAY OF TROPONIN QUANT: CPT

## 2024-02-04 PROCEDURE — 84132 ASSAY OF SERUM POTASSIUM: CPT

## 2024-02-04 RX ORDER — CEFDINIR 250 MG/5ML
1 POWDER, FOR SUSPENSION ORAL
Qty: 14 | Refills: 0
Start: 2024-02-04 | End: 2024-02-10

## 2024-02-04 RX ORDER — AZITHROMYCIN 500 MG/1
1 TABLET, FILM COATED ORAL
Qty: 4 | Refills: 0
Start: 2024-02-04 | End: 2024-02-07

## 2024-02-04 RX ADMIN — CEFTRIAXONE 100 MILLIGRAM(S): 500 INJECTION, POWDER, FOR SOLUTION INTRAMUSCULAR; INTRAVENOUS at 02:16

## 2024-02-04 RX ADMIN — Medication 100 MILLIGRAM(S): at 06:50

## 2024-02-04 RX ADMIN — LOSARTAN POTASSIUM 100 MILLIGRAM(S): 100 TABLET, FILM COATED ORAL at 08:35

## 2024-02-04 RX ADMIN — Medication 60 MILLIGRAM(S): at 06:50

## 2024-02-04 RX ADMIN — AZITHROMYCIN 255 MILLIGRAM(S): 500 TABLET, FILM COATED ORAL at 02:50

## 2024-02-04 NOTE — ED CDU PROVIDER SUBSEQUENT DAY NOTE - PROGRESS NOTE DETAILS
MONTY Iniguez: symptoms improved since ED arrival feels comfortable with discharge home. tolerated PO. cleared for discharge home per ED attending Dr. Allen.    Daughter at bedside will take pt home

## 2024-02-04 NOTE — ED CDU PROVIDER SUBSEQUENT DAY NOTE - HISTORY
CDU PROGRESS NOTE MONTY PASCUAL: No interval change. pt resting comfortably, NAD. VSS afebrile. no respiratory distress. Will continue to monitor
No interval changes since initial CDU provider note. Pt  without new complaint. NAD VSS, although temp 99.8F. Plan to continue antibiotics in the setting of pneumonia.  - MONTY Saunders

## 2024-02-04 NOTE — ED CDU PROVIDER SUBSEQUENT DAY NOTE - PHYSICAL EXAMINATION
· CONSTITUTIONAL: Well appearing, awake, alert, oriented to person, place, time/situation and in no apparent distress.  · ENMT: Airway patent.  · EYES: Clear bilaterally  · CARDIAC: Normal rate, regular rhythm.  Heart sounds S1, S2.  · RESPIRATORY: No increased work of breathing.  Bilateral rhonchi, no wheezing  · GASTROINTESTINAL: Abdomen soft, non-tender  · MUSCULOSKELETAL: Steady gait  · NEUROLOGICAL: Alert and oriented, moving all extremities, clear speech, follows commands  · SKIN: No evidence of rash.  · PSYCHIATRIC: normal mood and affect.
· CONSTITUTIONAL: Well appearing, awake, alert, oriented to person, place, time/situation and in no apparent distress.  · ENMT: Airway patent.  · EYES: Clear bilaterally  · CARDIAC: Normal rate, regular rhythm.  Heart sounds S1, S2.  · RESPIRATORY: No increased work of breathing.  Bilateral rhonchi, no wheezing  · GASTROINTESTINAL: Abdomen soft, non-tender  · MUSCULOSKELETAL: Steady gait  · NEUROLOGICAL: Alert and oriented, moving all extremities, clear speech, follows commands  · SKIN: No evidence of rash.  · PSYCHIATRIC: normal mood and affect.

## 2024-02-04 NOTE — ED ADULT NURSE REASSESSMENT NOTE - NSFALLUNIVINTERV_ED_ALL_ED
Bed/Stretcher in lowest position, wheels locked, appropriate side rails in place/Call bell, personal items and telephone in reach/Instruct patient to call for assistance before getting out of bed/chair/stretcher/Non-slip footwear applied when patient is off stretcher/[place] to call system/Physically safe environment - no spills, clutter or unnecessary equipment/Purposeful proactive rounding/Room/bathroom lighting operational, light cord in reach
Bed/Stretcher in lowest position, wheels locked, appropriate side rails in place/Call bell, personal items and telephone in reach/Instruct patient to call for assistance before getting out of bed/chair/stretcher/Non-slip footwear applied when patient is off stretcher/[place] to call system/Physically safe environment - no spills, clutter or unnecessary equipment/Purposeful proactive rounding/Room/bathroom lighting operational, light cord in reach
Bed/Stretcher in lowest position, wheels locked, appropriate side rails in place/Call bell, personal items and telephone in reach/Instruct patient to call for assistance before getting out of bed/chair/stretcher/Non-slip footwear applied when patient is off stretcher/[place] to call system/Physically safe environment - no spills, clutter or unnecessary equipment/Purposeful proactive rounding/Room/bathroom lighting operational, light cord in reach

## 2024-02-04 NOTE — ED CDU PROVIDER SUBSEQUENT DAY NOTE - NS ED ROS FT
· CONSTITUTIONAL: no fever and no chills.  · CARDIOVASCULAR: - - -  · Cardiovascular [+]: +chest discomfort  · RESPIRATORY: - - -  · Respiratory [+]: COUGH, SHORTNESS OF BREATH  · ENDOCRINE: no diabetes  · ROS STATEMENT: all other ROS negative except as per HPI
· CONSTITUTIONAL: no fever and no chills.  · CARDIOVASCULAR: - - -  · Cardiovascular [+]: +chest discomfort  · RESPIRATORY: - - -  · Respiratory [+]: COUGH, SHORTNESS OF BREATH  · ENDOCRINE: no diabetes  · ROS STATEMENT: all other ROS negative except as per HPI

## 2024-02-04 NOTE — ED CDU PROVIDER SUBSEQUENT DAY NOTE - NSICDXPASTMEDICALHX_GEN_ALL_CORE_FT
PAST MEDICAL HISTORY:  Arthritis     Diverticulitis     Hypertension     Osteoporosis     
PAST MEDICAL HISTORY:  Arthritis     Diverticulitis     Hypertension     Osteoporosis

## 2024-02-04 NOTE — ED CDU PROVIDER SUBSEQUENT DAY NOTE - CLINICAL SUMMARY MEDICAL DECISION MAKING FREE TEXT BOX
see MD Note
[facility] Attending     88Y F PMH HTN, osteoporosis, diverticulitis presenting with cough x1 month. CTA showing evidence of pneumonia with pleural effusion.   Ambulatory sat 91%. patient feels well, endorsing mild cough. otherwise no worsening sob, cp overnight. tolerating PO     Vital signs reviewed  GENERAL: Patient nontoxic appearing, NAD  HEAD: NCAT  EYES: Anicteric  ENT: MMM  NECK: Supple, non tender  RESPIRATORY: Normal respiratory effort. CTA B/L. No wheezing, rales, rhonchi  CARDIOVASCULAR: Regular rate and rhythm  ABDOMEN: Soft. Nondistended. Nontender. No guarding or rebound. No CVA tenderness.  MUSCULOSKELETAL/EXTREMITIES: Brisk cap refill. 2+ radial pulses. No leg edema.  SKIN:  Warm and dry  NEURO: AAOx3. No gross FND.  PSYCHIATRIC: Cooperative. Affect appropriate.     PORT/PSI score low risk. Spoke with daughter regarding disposition planning. Daughter feels nervous as patient lives alone and is improving on iv abx. plan to continue observing patient in CDU, IV abx, repeat labs

## 2024-02-04 NOTE — ED ADULT NURSE REASSESSMENT NOTE - NS ED NURSE REASSESS COMMENT FT1
Pt received from MARILOU Gagnon. Pt A&O X4, oriented to CDU & plan of care was discussed. Pt in CDU for Telemetry, IV antibiotics. Pt continue to have cough. Pt denies any chest pain, dizziness or palpitations. Temp 99.6, O2 sat 93% on RA. IV patent and free of signs of infiltration. Pt ambulated to BR independently. Fall and safety precautions initiated and reviewed with pt, pt verbalized understanding of the same. Educated pt to call for assistance as needed. Call bell in reach. Will continue to monitor.
Pt received from MARILOU Yates. Pt A&O x 4. Ambulatory to restroom. Pt in CDU for IV antibiotics . Pt denies any chest pain, SOB, dizziness or palpitations at this time. Patient endorsed coughing. see EMR.  V/S stable, pt afebrile,  IV in place, patent and free of signs of infiltration. Safety & comfort measures maintained. Call bell in reach. Care continues.
07.00 Received the Pt from  MARILOU Purdy  Pt is Observed for pnuemonia CP/sob. Received the Pt A&OX 4  Pt obeys commands Kathia N/V/D fever chills cp SOB   Comfort care & safety measures continued  IV site looks clean & dry no signs of infiltration noted pt denies  pain IV site .Pt is oriented to the unit Plan of care explained .  Pt is advised to call for help  call bell with in the reach pt verbalized the understanding .  pending CDU  MD carrillo . GCS 15/15 A&OX 4 PERRLA  size 3 Strong upper & lower extremities steady gait  Pt is ambulatory & independent   No facial droop  No Hand Leg drop denies numbness tingling  Plan of care ongoing
07.00 Received the Pt from  MARILOU Tong Pt is Observed for pneumonia. Received the Pt A&OX 4  Pt obeys commands Kathia N/V/D fever chills cp SOB   Comfort care & safety measures continued  IV site looks clean & dry no signs of infiltration noted pt denies  pain IV site .Pt is oriented to the unit Plan of care explained .  Pt is advised to call for help  call bell with in the reach pt verbalized the understanding .  pending CDU  MD carrillo . GCS 15/15 A&OX 4 PERRLA  size 3 Strong upper & lower extremities steady gait  Pt is ambulatory & independent   No facial droop  No Hand Leg drop denies numbness tingling  pt has cough  Plan of care ongoing  09.00 Pt is evaluated by CDU MD Jonathon Allen. pt is feeling better.  Pt is discharged . Ml batool Figu[place]   explained the follow up care & gave the discharge summary  . Pt has stable vitals steady gait A&OX 4 at the time of Discharge